# Patient Record
Sex: MALE | Race: BLACK OR AFRICAN AMERICAN | NOT HISPANIC OR LATINO
[De-identification: names, ages, dates, MRNs, and addresses within clinical notes are randomized per-mention and may not be internally consistent; named-entity substitution may affect disease eponyms.]

---

## 2022-05-17 PROBLEM — Z00.00 ENCOUNTER FOR PREVENTIVE HEALTH EXAMINATION: Status: ACTIVE | Noted: 2022-05-17

## 2022-05-19 ENCOUNTER — APPOINTMENT (OUTPATIENT)
Dept: INFECTIOUS DISEASE | Facility: CLINIC | Age: 22
End: 2022-05-19

## 2022-05-19 ENCOUNTER — OUTPATIENT (OUTPATIENT)
Dept: OUTPATIENT SERVICES | Facility: HOSPITAL | Age: 22
LOS: 1 days | End: 2022-05-19

## 2022-05-19 VITALS
SYSTOLIC BLOOD PRESSURE: 104 MMHG | HEART RATE: 73 BPM | WEIGHT: 166 LBS | OXYGEN SATURATION: 99 % | RESPIRATION RATE: 14 BRPM | DIASTOLIC BLOOD PRESSURE: 63 MMHG | TEMPERATURE: 97 F

## 2022-05-19 DIAGNOSIS — A53.9 SYPHILIS, UNSPECIFIED: ICD-10-CM

## 2022-05-19 DIAGNOSIS — A51.9 EARLY SYPHILIS, UNSPECIFIED: ICD-10-CM

## 2022-05-19 DIAGNOSIS — Z72.53 HIGH RISK BISEXUAL BEHAVIOR: ICD-10-CM

## 2022-05-19 DIAGNOSIS — A56.3 CHLAMYDIAL INFECTION OF ANUS AND RECTUM: ICD-10-CM

## 2022-05-19 DIAGNOSIS — Z72.52 HIGH RISK HOMOSEXUAL BEHAVIOR: ICD-10-CM

## 2022-05-19 DIAGNOSIS — E55.9 VITAMIN D DEFICIENCY, UNSPECIFIED: ICD-10-CM

## 2022-05-19 DIAGNOSIS — Z11.4 ENCOUNTER FOR SCREENING FOR HUMAN IMMUNODEFICIENCY VIRUS [HIV]: ICD-10-CM

## 2022-05-19 DIAGNOSIS — Z79.899 OTHER LONG TERM (CURRENT) DRUG THERAPY: ICD-10-CM

## 2022-05-19 LAB
CONTROL LINE: PRESENT
HIV 1+2 AB+HIV1P24 AG SERPLBLD IA.RAPID: NEGATIVE
HIV-1 / HIV-2 ANTIBODY: NORMAL
HIV1 P24 AG SERPL IA-MCNC: NEGATIVE

## 2022-05-19 PROCEDURE — 99204 OFFICE O/P NEW MOD 45 MIN: CPT | Mod: GC

## 2022-05-19 RX ORDER — PENICILLIN G BENZATHINE 2400000 [IU]/4ML
2400000 INJECTION, SUSPENSION INTRAMUSCULAR
Qty: 0 | Refills: 0 | Status: COMPLETED | OUTPATIENT
Start: 2022-05-19

## 2022-05-19 RX ORDER — CHOLECALCIFEROL (VITAMIN D3) 1250 MCG
1.25 MG CAPSULE ORAL WEEKLY
Qty: 8 | Refills: 0 | Status: ACTIVE | COMMUNITY
Start: 2022-05-19 | End: 1900-01-01

## 2022-05-19 RX ADMIN — PENICILLIN G BENZATHINE 2.4 UNIT/4ML: 2400000 INJECTION, SUSPENSION INTRAMUSCULAR at 00:00

## 2022-05-19 NOTE — ASSESSMENT
[FreeTextEntry1] : 21M MSM with no significant past medical history. Presented form his PCP office for starting on Prep and syphilis treatment.\par \par #Prep\par POC today negative, last POC at PCP clinic 5/13 was negative\par Pharmacist discussed with patient oral vs injectable options\par Patient is wishing to start injectables, starting now on Descovy pending insurance approval for injectables \par Labs from PCP obtained on 5/13 was reviewed and scanned. \par \par #Syphilis\par On labs from 5/13 RPR 1:128; **** unknown duration of infection as pt is new for his PCP as well ****\par Treated as Late/Latent syphilis \par Gave penicillin today, RTC in 1 week for second penicillin shot, will need additional a week later.\par Advised patient to repeat testing to make sure eradication of infection\par Advised patient to avoid sexual encounters for the next 2 weeks \par Advised patient to contact sexual partners to get treated as well\par \par #Anal and ureteral chlamydia \par Patient was diagnosed on 5/13, on Doxy follows with his PCP, day 6/7 of treatment\par Advised patient to repeat testing to make sure eradication of infection\par Advised patient to avoid sexual encounters for the next 2 weeks \par \par #Vit D deficiency\par Previous labs 5/13 with vit D level of 15\par Recommending Vit D 50,000u weekly for 8 weeks \par \par #HCM\par Patient is following with us for Prep, will continue following his PCP \par Patient will present next visit his vaccination card, for HPV - if not vaccinated, will administer next visit\par RTC in 1 week for second penicillin shot, will need additional a week later. - Late/Latent syphilis

## 2022-05-19 NOTE — END OF VISIT
[FreeTextEntry3] : Patent referred from 's office for initiation of PrEP and early syphilis therapy.\par Already on Doxy for chlamydia infection\par \par Education about PrEP and partner notification\par \par POCT HIV negative in the office\par Start Descovy- will transition to injectable possibly later\par \par RTC in 1 month for re testing to cover window period

## 2022-05-19 NOTE — HISTORY OF PRESENT ILLNESS
[FreeTextEntry1] : Initiating Prep and syphilis treatment  [de-identified] : 21M MSM with no significant past medical history. Patient was presented form his PCP office for starting on Prep. Patient visited his PCP last week, was found to have anal chlamydia and syphilis positive. Patient was started on Doxycycline for 7 days (on day 6 of his tx), and was notified of his positive syphilis screen, needing penicillin treatment. Patient is sexually active, MSM, bottom. Patient had 3 partners the past month do not use barrier protection consistently. Patient is willing to start prep today. brought his lab result from last week.  Patient has no CP, SOB, changes in his BM, dysuria, f/c/n/v.

## 2022-05-20 PROBLEM — Z72.53 HIGH RISK BISEXUAL BEHAVIOR: Status: RESOLVED | Noted: 2022-05-19 | Resolved: 2022-05-20

## 2022-05-20 PROBLEM — Z79.899 ON PRE-EXPOSURE PROPHYLAXIS FOR HIV: Status: ACTIVE | Noted: 2022-05-20

## 2022-05-20 PROBLEM — Z72.52 HIGH RISK HOMOSEXUAL BEHAVIOR: Status: ACTIVE | Noted: 2022-05-20

## 2022-05-20 RX ORDER — CABOTEGRAVIR 600 MG/3ML
600 KIT INTRAMUSCULAR
Qty: 3 | Refills: 0 | Status: ACTIVE | COMMUNITY
Start: 2022-05-19

## 2022-05-20 RX ORDER — EMTRICITABINE AND TENOFOVIR ALAFENAMIDE 200; 25 MG/1; MG/1
200-25 TABLET ORAL DAILY
Qty: 30 | Refills: 3 | Status: ACTIVE | COMMUNITY
Start: 2022-05-19

## 2022-05-26 ENCOUNTER — APPOINTMENT (OUTPATIENT)
Dept: INFECTIOUS DISEASE | Facility: CLINIC | Age: 22
End: 2022-05-26

## 2022-05-26 ENCOUNTER — OUTPATIENT (OUTPATIENT)
Dept: OUTPATIENT SERVICES | Facility: HOSPITAL | Age: 22
LOS: 1 days | End: 2022-05-26

## 2022-05-26 VITALS
BODY MASS INDEX: 19.97 KG/M2 | RESPIRATION RATE: 12 BRPM | OXYGEN SATURATION: 99 % | HEIGHT: 76 IN | DIASTOLIC BLOOD PRESSURE: 65 MMHG | WEIGHT: 164 LBS | SYSTOLIC BLOOD PRESSURE: 112 MMHG | HEART RATE: 73 BPM | TEMPERATURE: 97.1 F

## 2022-05-26 RX ADMIN — PENICILLIN G BENZATHINE 2.4 UNIT/4ML: 2400000 INJECTION, SUSPENSION INTRAMUSCULAR at 00:00

## 2022-06-02 ENCOUNTER — APPOINTMENT (OUTPATIENT)
Dept: INFECTIOUS DISEASE | Facility: CLINIC | Age: 22
End: 2022-06-02

## 2022-06-02 RX ORDER — PENICILLIN G BENZATHINE 2400000 [IU]/4ML
2400000 INJECTION, SUSPENSION INTRAMUSCULAR
Qty: 0 | Refills: 0 | Status: COMPLETED | OUTPATIENT
Start: 2022-05-26

## 2022-08-22 ENCOUNTER — APPOINTMENT (OUTPATIENT)
Dept: INFECTIOUS DISEASE | Facility: CLINIC | Age: 22
End: 2022-08-22